# Patient Record
Sex: MALE | Race: AMERICAN INDIAN OR ALASKA NATIVE
[De-identification: names, ages, dates, MRNs, and addresses within clinical notes are randomized per-mention and may not be internally consistent; named-entity substitution may affect disease eponyms.]

---

## 2017-05-06 ENCOUNTER — HOSPITAL ENCOUNTER (OUTPATIENT)
Dept: HOSPITAL 14 - H.ER | Age: 27
Setting detail: OBSERVATION
LOS: 4 days | Discharge: PSYCHIATRIC OR SUBSTANCE USE FACILITY | End: 2017-05-10
Attending: EMERGENCY MEDICINE | Admitting: EMERGENCY MEDICINE
Payer: COMMERCIAL

## 2017-05-06 DIAGNOSIS — S01.511A: ICD-10-CM
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
## 2017-05-06 DIAGNOSIS — Z87.891: ICD-10-CM

## 2017-05-06 DIAGNOSIS — Z78.1: ICD-10-CM
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
## 2017-05-06 DIAGNOSIS — F31.60: Primary | ICD-10-CM

## 2017-05-06 DIAGNOSIS — Z59.0: ICD-10-CM

## 2017-05-06 DIAGNOSIS — F63.9: ICD-10-CM

## 2017-05-06 DIAGNOSIS — R45.851: ICD-10-CM

## 2017-05-06 LAB
ALBUMIN/GLOB SERPL: 1.2 [,] (ref 1–2.1)
ALP SERPL-CCNC: 60 [, U/L] (ref 38–126)
ALT SERPL-CCNC: 96 [, U/L] (ref 21–72)
AST SERPL-CCNC: 96 [, U/L] (ref 17–59)
BASOPHILS # BLD AUTO: 0.1 [, K/UL] (ref 0–0.2)
BASOPHILS NFR BLD: 1.1 [, %] (ref 0–2)
BILIRUB SERPL-MCNC: 1 [, MG/DL] (ref 0.2–1.3)
BUN SERPL-MCNC: 6 [, MG/DL] (ref 9–20)
CALCIUM SERPL-MCNC: 9.2 [, MG/DL] (ref 8.4–10.2)
CHLORIDE SERPL-SCNC: 107 [, MMOL/L] (ref 98–107)
CO2 SERPL-SCNC: 23 [, MMOL/L] (ref 22–30)
EOSINOPHIL # BLD AUTO: 0.1 [, K/UL] (ref 0–0.7)
EOSINOPHIL NFR BLD: 1.4 [, %] (ref 0–4)
ERYTHROCYTE [DISTWIDTH] IN BLOOD BY AUTOMATED COUNT: 13.2 [, %] (ref 11.5–14.5)
ETHANOL SERPL-MCNC: 31 [, MG/DL] (ref 0–10)
GLOBULIN SER-MCNC: 3.4 [, GM/DL] (ref 2.2–3.9)
GLUCOSE SERPL-MCNC: 102 [, MG/DL] (ref 75–110)
HCT VFR BLD CALC: 42.7 [, %] (ref 35–51)
LYMPHOCYTES # BLD AUTO: 1.4 [, K/UL] (ref 1–4.3)
LYMPHOCYTES NFR BLD AUTO: 26.4 [, %] (ref 20–40)
MCH RBC QN AUTO: 31.5 [, PG] (ref 27–31)
MCHC RBC AUTO-ENTMCNC: 33.6 [, G/DL] (ref 33–37)
MCV RBC AUTO: 93.9 [, FL] (ref 80–94)
MONOCYTES # BLD: 0.5 [, K/UL] (ref 0–0.8)
MONOCYTES NFR BLD: 9.8 [, %] (ref 0–10)
NEUTROPHILS # BLD: 3.2 [, K/UL] (ref 1.8–7)
NEUTROPHILS NFR BLD AUTO: 61.3 [, %] (ref 50–75)
NRBC BLD AUTO-RTO: 0.1 [, %] (ref 0–0)
PLATELET # BLD: 194 [, K/UL] (ref 130–400)
PMV BLD AUTO: 8.6 [, FL] (ref 7.2–11.7)
POTASSIUM SERPL-SCNC: 3.7 [, MMOL/L] (ref 3.6–5)
PROT SERPL-MCNC: 7.5 [, G/DL] (ref 6.3–8.2)
SODIUM SERPL-SCNC: 141 [, MMOL/L] (ref 132–148)
WBC # BLD AUTO: 5.2 [, K/UL] (ref 4.8–10.8)

## 2017-05-06 SDOH — ECONOMIC STABILITY - HOUSING INSECURITY: HOMELESSNESS: Z59.0

## 2017-05-06 NOTE — ED PDOC
- Laboratory Results


Result Diagrams: 


 05/06/17 04:45





 05/06/17 04:45





- ECG


O2 Sat by Pulse Oximetry: 98 (RA)





Medical Decision Making


Medical Decision Making: 


Receiving sign out:


Pt signed over to me by Dr. Castillo pending crisis evaluation. Pt required 

medication for psychosis.





Scribe Attestation:


Documented by Bailey Oro acting as a scribe for Beverley Sun MD.





Provider Attestation:


All medical record entries made by the Scribe were at my direction and 

personally dictated by me. I have reviewed the chart and agree that the record 

accurately reflects my personal performance of the history, physical exam, 

medical decision making, and the department course for this patient. I have 

also personally directed, reviewed, and agree with the discharge instructions 

and disposition.





Disposition





- Clinical Impression


Clinical Impression: 


 Depression, Laceration








- POA


Present On Arrival: None





- Disposition


Disposition: Transfer of Care


Disposition Time: 00:00


Condition: FAIR


Patient Signed Over To: Cesar Drew


Handoff Comments: Pending bed availability at [facility]





ED OBSERVATION


Date of observation admission: 05/06/17





- Observation admission statement


Patient is being placed in observation because:: 


Pt awaiting crisis evaluation





- Goals of Observation


Goals of observation are:: 


Crisis evaluation and final ER disposition.





- Progress Note


Progress Note: 





05/06/17 15:55


Pt resting in room.


7p Stable.


10p Stable


12am Endorsed to Dr Drew. Pending bed availability. Stable.

## 2017-05-06 NOTE — ED PDOC
- Laboratory Results


Result Diagrams: 


 05/06/17 04:45





 05/06/17 04:45





- ECG


O2 Sat by Pulse Oximetry: 100 (RA)


Pulse Ox Interpretation: Normal





Medical Decision Making


Medical Decision Making: 


Receiving Sign Out:


Pt signed out to me by Dr. Drew pending [facility] screen.





Scribe Attestation:


Documented by Bailey Oro acting as a scribe for Troy Castillo MD.





Provider Attestation:


All medical record entries made by the Scribe were at my direction and 

personally dictated by me. I have reviewed the chart and agree that the record 

accurately reflects my personal performance of the history, physical exam, 

medical decision making, and the department course for this patient. I have 

also personally directed, reviewed, and agree with the discharge instructions 

and disposition.





Disposition





- Disposition


Condition: FAIR





Progress Note





- Review of Symptoms


Events since last encounter: 


Time: 0943


Pt attempted to elope and ran into a door. Sustained 2 lip lacerations. Will 

suture wounds and sedate patient.


2mg Ativan and 5mg Haldol administered. See procedure note for wound care.





Procedures





- Time-Out


Type of Procedure: Laceration repair


Correct Patient: Yes


Correct Procedure: Yes


Physician Name: Troy Castillo





- Laceration/Wound Repair


  ** Inner lip laceration


Wound Length (cm): 2


Wound's Depth, Shape: superficial, linear


Wound Explored: clean


Betadine Prep?: Yes


Anesthesia: 1% Lidocaine


Wound Repaired With: Sutures


Suture Size/Type: 5:0, nylon


Number of Sutures: 3


Wound Complexity: Simple


Progress: 





Pt tolerated procedure well.





  ** Outer lip laceration


Wound Length (cm): 2


Wound's Depth, Shape: superficial, linear


Betadine Prep?: Yes


Anesthesia: 1% Lidocaine


Wound Repaired With: Sutures


Suture Size/Type: 5:0, nylon


Number of Sutures: 3


Wound Complexity: Simple


Progress: 





Pt tolerate procedure well.

## 2017-05-06 NOTE — ED PDOC
HPI: Psych/Substance Abuse





<Cesar Drew - Last Filed: 05/06/17 06:47>


Chief Complaint (Provider): pysch eval


History Per: Patient


Additional Complaint(s): 





25yo M in ED for eval-sent to ED friends called EMS for alleged FB post stating 

that he wants to kill himself. Ptdenies posting anything, denies SI/HI/

hallucinations. 





<Pepper Milton - Last Filed: 05/06/17 21:01>


Time Seen by Provider: 05/06/17 03:16


Chief Complaint (Nursing): Psychiatric Evaluation





Past Medical History


Vital Signs: 





 Last Vital Signs











Temp  97.9 F   05/06/17 02:41


 


Pulse  57 L  05/06/17 02:41


 


Resp  17 05/06/17 02:41


 


BP  122/48 L  05/06/17 02:41


 


Pulse Ox  100   05/06/17 03:37














<Cesar Drew - Last Filed: 05/06/17 06:47>


Reviewed: Historical Data, Nursing Documentation, Vital Signs


Vital Signs: 





 Last Vital Signs











Temp  97.9 F   05/06/17 02:41


 


Pulse  57 L  05/06/17 02:41


 


Resp  17 05/06/17 02:41


 


BP  122/48 L  05/06/17 02:41


 


Pulse Ox  100   05/06/17 02:41














- Medical History


PMH: No Chronic Diseases





- Family History


Family History: States: No Known Family Hx





<Pepper Milton - Last Filed: 05/06/17 21:01>





- Home Medications


Home Medications: 


 Ambulatory Orders











 Medication  Instructions  Recorded


 


Unobtainable  05/06/17














- Allergies


Allergies/Adverse Reactions: 


 Allergies











Allergy/AdvReac Type Severity Reaction Status Date / Time


 


diphenhydramine Allergy Mild RASH Verified 05/06/17 02:45





[From Benadryl]     














Review of Systems


ROS Statement: Except As Marked, All Systems Reviewed And Found Negative


Psych: Negative for: Anxiety, Depression, Psychosis, Suicidal ideation, 

Withdrawal





<Pepper Milton - Last Filed: 05/06/17 21:01>





Physical Exam





- Reviewed


Nursing Documentation Reviewed: Yes


Vital Signs Reviewed: Yes





- Physical Exam


Appears: Positive for: Well, Non-toxic, No Acute Distress


Head Exam: Positive for: ATRAUMATIC, NORMAL INSPECTION, NORMOCEPHALIC


Skin: Positive for: Normal Color, Warm, DRY


Eye Exam: Positive for: EOMI, Normal appearance, PERRL


Cardiovascular/Chest: Positive for: Regular Rate, Rhythm


Respiratory: Positive for: CNT, Normal Breath Sounds


Neurologic/Psych: Positive for: Alert, Oriented





<Pepper Milton - Last Filed: 05/06/17 21:01>





- Laboratory Results


Result Diagrams: 


 05/06/17 04:45





 05/06/17 04:45





<Cesar Drew - Last Filed: 05/06/17 06:47>





- Laboratory Results


Result Diagrams: 


 05/06/17 04:45





 05/06/17 04:45





- ECG


O2 Sat by Pulse Oximetry: 100





- Progress


ED Course And Treament: 





pt will be placed on 1:1 and get crisis evaluation. 


Re-evaluation Time: 03:36


Condition: Re-examined (pt became very agressive and required restraints and 

ativan IM. )





<Pepper Milton - Last Filed: 05/06/17 21:01>





Disposition





- Patient ED Disposition


Is Patient to be Admitted: Transfer of Care





- Disposition


Disposition: Transfer of Care


Disposition Time: 07:00


Patient Signed Over To: Troy Castillo (Transfer pending crisis eval)





<Cesar Drew - Last Filed: 05/06/17 06:47>





- Patient ED Disposition


Is Patient to be Admitted: Transfer of Care





<Pepper Milton - Last Filed: 05/06/17 21:01>





- Clinical Impression


Clinical Impression: 


 Depression, Laceration








- Disposition


Condition: FAIR

## 2017-05-07 LAB
BACTERIA #/AREA URNS HPF: (no result) [,]
BILIRUB UR-MCNC: NEGATIVE [,]
COLOR UR: YELLOW [,]
GLUCOSE UR STRIP-MCNC: (no result) [, MG/DL]
KETONES UR STRIP-MCNC: NEGATIVE [, MG/DL]
LEUKOCYTE ESTERASE UR-ACNC: (no result) [, LEU/UL]
PH UR STRIP: 6 [,] (ref 5–8)
PROT UR STRIP-MCNC: NEGATIVE [, MG/DL]
RBC # UR STRIP: NEGATIVE [,]
RBC #/AREA URNS HPF: 5 [, /HPF] (ref 0–3)
SP GR UR STRIP: 1.01 [,] (ref 1–1.03)
UROBILINOGEN UR-MCNC: (no result) [, MG/DL] (ref 0.2–1)
WBC #/AREA URNS HPF: 2 [, /HPF] (ref 0–5)

## 2017-05-07 NOTE — ED PDOC
- Laboratory Results


Result Diagrams: 


 05/06/17 04:45





 05/06/17 04:45





- ECG


ECG: Positive for: Interpreted By Me


ECG Rhythm: Positive for: Normal QRS, Normal ST Segment, Sinus Rhythm


O2 Sat by Pulse Oximetry: 98 (RA)





Disposition





- Clinical Impression


Clinical Impression: 


 Depression, Laceration








- POA


Present On Arrival: Falls Or Trauma





- Disposition


Disposition: Transfer of Care


Disposition Time: 00:00


Condition: STABLE





ED OBSERVATION


Date of observation admission: 05/06/17


Time of observation admission: 04:20





- Observation admission statement


Patient is being placed in observation because:: 





Was placed on obs for time intensive workup, treatment, evaluation and 

stabilization of psychiatric condition.





- Goals of Observation


Goals of observation are:: 





Stabilization of psychiatric condition until transfer to [facility]





- Progress Note


Progress Note: 


3p


Pt asking for books to read. Reporting he is bored and it is making him feel 

worse. Otherwise stable.


Pending eval Dr Clemente.





6p Slightly angry and asking to walk around. Able to redirect back to room.





9p Stable. Psych recommendations appreciated.





12am Endorsed to Dr Katz pending bed availability.

## 2017-05-07 NOTE — ED PDOC
- Laboratory Results


Result Diagrams: 


 05/06/17 04:45





 05/06/17 04:45





- ECG


O2 Sat by Pulse Oximetry: 98 (RA)





Medical Decision Making


Medical Decision Making: 


Patient s/o from Dr. Sun at 0000 pending [facility] bed availability. Patient s/

o to Dr. Fuentes at 0700 pending [facility] bed availability. 











-----------------------


Scribe Attestation:


Documented by STEPHEN Mckeon acting as a scribe for Kristine Katz MD.  


Provider Scribe Attestation:


All medical record entries made by the Scribe were at my direction and 

personally dictated by me. I


have reviewed the chart and agree that the record accurately reflects my 

personal performance of the


history, physical exam, medical decision making, and the department course for 

this patient. I have


also personally directed, reviewed, and agree with the discharge instructions 

and disposition.   








Disposition





- Clinical Impression


Clinical Impression: 


 Depression, Laceration








- POA


Present On Arrival: None





- Disposition


Disposition: Transfer of Care


Disposition Time: 07:00


Condition: STABLE


Patient Signed Over To: Mick Fuentes


Handoff Comments: pending [facility] bed availability





ED OBSERVATION


Date of observation admission: 05/06/17


Time of observation admission: 04:12





- Observation admission statement


Patient is being placed in observation because:: 





SI 





- Progress Note


Progress Note: 





0205: Patient resting comfortably in no distress.

## 2017-05-07 NOTE — ED PDOC
- Laboratory Results


Result Diagrams: 


 17 04:45





 17 04:45





- ECG


O2 Sat by Pulse Oximetry: 100





Medical Decision Making


Medical Decision Makin:





Patient is being transferred to Dr. Drew pending crisis evaluation.





Pt is to be screened by [facility]. 








Scribe Attestation:


Documented by Evelin Kelly acting as a scribe for Cesar Drew MD.





Provider Scribe Attestation:


All medical record entries made by theAntoniowere at my direction and 

personally dictated by me. I


have reviewed the chart and agree that the record accurately reflects my 

personal performance of the


history, physical exam, medical decision making, and the department course for 

this patient. I have


also personally directed, reviewed, and agree with the discharge instructions 

and disposition.


--------------------------------------------------------------------------------

----------------------------------





Disposition





- Clinical Impression


Clinical Impression: 


 Depression, Laceration








- POA


Present On Arrival: None





- Disposition


Disposition: Transfer of Care


Disposition Time: 07:00


Condition: FAIR


Patient Signed Over To: Gavino Baxter III


Handoff Comments: pending [facility] screen

## 2017-05-07 NOTE — CP.PCM.CON
History of Present Illness





- History of Present Illness


History of Present Illness: 





This is a 26 yr old male who has been brought in for psych evaluation because 

pt has posted on facebook that he wanted to kill himself.pt was offered in 

voluntary psych admission and pt refused and has been referred for screening by 

[facility]





Past Patient History





- Past Social History


Smoking Status: Former Smoker





- CARDIAC


Hx Cardiac Disorders: No


Hx Hypertension: No





- PULMONARY


Hx Tuberculosis: No





- NEUROLOGICAL


HX Cerebrovascular Accident: No


Hx Seizures: No





- HEMATOLOGICAL/ONCOLOGICAL


Hx Cancer: No


Hx Human Immunodeficiency Virus (HIV): No





- GENITOURINARY/GYNECOLOGICAL


Hx Sexually Transmitted Disorders: No





- PSYCHIATRIC


Hx Substance Use: No





- SURGICAL HISTORY


Hx Surgeries: Yes


Other/Comment: dental surgery





- ANESTHESIA


Hx Anesthesia: No





Meds


Allergies/Adverse Reactions: 


 Allergies











Allergy/AdvReac Type Severity Reaction Status Date / Time


 


diphenhydramine Allergy Mild RASH Verified 05/06/17 02:45





[From Benadryl]     














Results





- Vital Signs


Recent Vital Signs: 


 Last Vital Signs











Temp  98.2 F   05/07/17 09:20


 


Pulse  78   05/07/17 09:20


 


Resp  19   05/07/17 09:20


 


BP  123/76   05/07/17 09:20


 


Pulse Ox  98   05/07/17 16:33














- Labs


Result Diagrams: 


 05/06/17 04:45





 05/06/17 04:45


Labs: 


 Laboratory Results - last 24 hr











  05/07/17





  03:06


 


Urine Color  Yellow


 


Urine Clarity  Slighty-cloudy


 


Urine pH  6.0


 


Ur Specific Gravity  1.012


 


Urine Protein  Negative


 


Urine Glucose (UA)  Neg


 


Urine Ketones  Negative


 


Urine Blood  Negative


 


Urine Nitrate  Negative


 


Urine Bilirubin  Negative


 


Urine Urobilinogen  0.2-1.0


 


Ur Leukocyte Esterase  Neg


 


Urine RBC (Auto)  5 H


 


Urine Microscopic WBC  2


 


Urine Bacteria  Occ H

## 2017-05-08 NOTE — ED PDOC
- Laboratory Results


Result Diagrams: 


 05/06/17 04:45





 05/06/17 04:45





- ECG


O2 Sat by Pulse Oximetry: 98 (RA)





Disposition





- Clinical Impression


Clinical Impression: 


 Depression, Laceration








- POA


Present On Arrival: None





- Disposition


Disposition: Transfer of Care


Disposition Time: 17:00


Condition: STABLE


Patient Signed Over To: Beverley Sun





ED OBSERVATION





- Progress Note


Progress Note: 


05/08/17 07:00


signed over to me by KENTRELL Katz MD pending [facility] bed availability. 





05/08/17 07:26


CXR from previous shift as interpreted by me:


no acute infiltrates, no active disease





05/08/17 08:34


vitals are stable





1030


vitals remain stable





1200


vitals remain stable. resting comfortably





1345


vitals remain stable





05/08/17 14:21


resting comfortably





05/08/17 16:33


vitals are stable





1700


signed over to SHEN Sun MD pending [facility] bed availability.





Additional Comments





- Additional Comments


Additional Comments: 


Scribe Attestation:


Documented by Rip Rocha acting as a scribe for Mick Fuentes MD.


Provider Scribe Attestation:


All medical record entries made by the Scribe were at my direction and 

personally dictated by me. I


have reviewed the chart and agree that the record accurately reflects my 

personal performance of the


history, physical exam, medical decision making, and the department course for 

this patient. I have


also personally directed, reviewed, and agree with the discharge instructions 

and disposition.

## 2017-05-08 NOTE — CARD
--------------- APPROVED REPORT --------------





EKG Measurement

Heart [id number]

MD [id number]

[id number]

[id number]

[id number]



<Conclusion>

Normal sinus rhythm

Normal ECG

## 2017-05-08 NOTE — ED PDOC
- Laboratory Results


Result Diagrams: 


 05/06/17 04:45





 05/06/17 04:45





- ECG


O2 Sat by Pulse Oximetry: 98





Disposition





- Clinical Impression


Clinical Impression: 


 Depression, Laceration








- POA


Present On Arrival: None





- Disposition


Disposition: Transfer of Care


Disposition Time: 00:00


Condition: STABLE


Patient Signed Over To: Kristine Katz


Handoff Comments: Pending bed availability [facility]





ED OBSERVATION





- Progress Note


Progress Note: 








5p Rec'd endorsement from Dr Fuentes. Pt with suicidality and accepted for 

admission at [facility] Involuntary psych. Pending bed.





630p Pt attempted to leave ER. Restraints ordered. Prn meds given.





9p Stable





12a Endorsed to Dr Katz pending bed. Stable.

## 2017-05-08 NOTE — CP.PCM.CON
History of Present Illness





- History of Present Illness


History of Present Illness: 





psychiatry consult


per routine


reason: agitation/suicidal threats





cc: i'm not crazy





hpi: 25 yo male, hospitalized 6 years previously, with what seems like 

synthetic mj reaction- "smoked something and went into a coma?"- per chart was 

bizarre behavior, dx with schizophrenia and placed on risperdal which may have 

lowered seizure threshold. pt is now in ER after being brought by police when 

people became concerned with his social media posts- possible suicidal/

homicidal threats? pt also recently made suicidal threats to mother per the 

crisis report. pt was very agitated and tried to elope and was restrained. he 

is irritable now, thinking this writer can discharge him home immediately. he 

superficially agrees to sign into hospital but then follows that up with "so i 

can start my discharge papers now right?" 





pt has been fighting with family- brother/mother and strangers in past week. he 

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 

representative. he denies having current psychiatric treatment.





past psych: as above


social history; ? homeless, has family who lives locally.


substance use: drinks alcohol, denies other substance use. uds is negative


medical: denies





mse: alert, oriented x 3. malodorous and unkempt. mood is irritated. affect 

constricted. speech is with angry tone. thoughts are focused on discharge. pt 

is denying si/hi currently. he is denying a/v hallucinations currently. memory 

intact. pt is minimizing presenting behaviors. poor i/j.





assessment:


bipolar disorder, mixed?- pt with irritable mood, poor impulse control, anger, 

suicidal threats





recommendation


prn meds are appropriate


pt is refusing oral meds and demanding to sign out, thus not a candidate to 

change status to voluntary


transfer to [facility] when bed available. 





Past Patient History





- Past Social History


Smoking Status: Former Smoker





- CARDIAC


Hx Cardiac Disorders: No


Hx Hypertension: No





- PULMONARY


Hx Tuberculosis: No





- NEUROLOGICAL


HX Cerebrovascular Accident: No


Hx Seizures: No





- HEMATOLOGICAL/ONCOLOGICAL


Hx Cancer: No


Hx Human Immunodeficiency Virus (HIV): No





- GENITOURINARY/GYNECOLOGICAL


Hx Sexually Transmitted Disorders: No





- PSYCHIATRIC


Hx Substance Use: No





- SURGICAL HISTORY


Hx Surgeries: Yes


Other/Comment: dental surgery





- ANESTHESIA


Hx Anesthesia: No





Meds


Allergies/Adverse Reactions: 


 Allergies











Allergy/AdvReac Type Severity Reaction Status Date / Time


 


diphenhydramine Allergy Mild RASH Verified 05/06/17 02:45





[From Benadryl]     














- Medications


Medications: 


 Current Medications





Haloperidol (Haldol)  5 mg PO TID PRN


   PRN Reason: Agitation


Haloperidol Lactate (Haldol)  5 mg IM Q6 PRN


   PRN Reason: Agitation


Lorazepam (Ativan)  1 mg IM Q6 PRN


   PRN Reason: Agitation


Lorazepam (Ativan)  1 mg PO TID PRN


   PRN Reason: Agitation











Results





- Vital Signs


Recent Vital Signs: 


 Last Vital Signs











Temp  97.8 F   05/08/17 13:51


 


Pulse  75   05/08/17 13:51


 


Resp  18   05/08/17 13:51


 


BP  116/57 L  05/08/17 13:51


 


Pulse Ox  98   05/08/17 14:22














- Labs


Result Diagrams: 


 05/06/17 04:45





 05/06/17 04:45

## 2017-05-08 NOTE — RAD
HISTORY:

med clr  



COMPARISON:

No prior. 



FINDINGS:



LUNGS:

Elevation left hemidiaphragm (lateral aspect greater than medial) 

with slight blunting left CP angle could be due to small effusion or 

chronic pleural thickening.



PLEURA:

As above. No pneumothorax apparent.



CARDIOVASCULAR:

Normal.



OSSEOUS STRUCTURES:

Mild dextroscoliosis mid thoracic region



VISUALIZED UPPER ABDOMEN:

Normal.



OTHER FINDINGS:

None.



IMPRESSION:

Elevation left hemidiaphragm (lateral aspect greater than medial) 

with slight blunting left CP angle could be due to small effusion or 

chronic pleural thickening.

## 2017-05-09 VITALS — TEMPERATURE: 97.8 F

## 2017-05-09 NOTE — CP.PCM.CON
History of Present Illness





- History of Present Illness


History of Present Illness: 


Psychiatry consult follow-up note, initally called for agitation/suicidal 

threats





CC: "I don't need to go to [place].  You need to go."





*Patient yelled at writer, refused to engage in conversation, stating that he 

does not need to go to [place].  Collateral obtained from the chart below.  





hpi: 27 yo male, hospitalized 6 years previously, with what seems like 

synthetic mj reaction- "smoked something and went into a coma?"- per chart was 

bizarre behavior, dx with schizophrenia and placed on risperdal which may have 

lowered seizure threshold. pt is now in ER after being brought by police when 

people became concerned with his social media posts- possible suicidal/

homicidal threats? pt also recently made suicidal threats to mother per the 

crisis report. pt was very agitated and tried to elope and was restrained. he 

is irritable now, thinking this writer can discharge him home immediately. he 

superficially agrees to sign into hospital but then follows that up with "so i 

can start my discharge papers now right?" 





pt has been fighting with family- brother/mother and strangers in past week. he 

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 

representative. he denies having current psychiatric treatment.





past psych: as above


social history; ? homeless, has family who lives locally.


substance use: drinks alcohol, denies other substance use. uds is negative


medical: denies





mse: alert, oriented x 3. malodorous and unkempt. mood is irritated. affect 

constricted. speech is with angry tone. thoughts are focused on discharge. pt 

refusing to answer questions including AH/VH/SI/HI. poor i/j.





assessment:


bipolar disorder, mixed?- pt with irritable mood, poor impulse control, anger, 

suicidal threats





recommendation


prn meds are appropriate


pt is refusing oral meds and demanding to sign out, thus not a candidate to 

change status to voluntary


transfer to [facility] when bed available. 





Past Patient History





- Past Social History


Smoking Status: Former Smoker





- CARDIAC


Hx Cardiac Disorders: No


Hx Hypertension: No





- PULMONARY


Hx Tuberculosis: No





- NEUROLOGICAL


HX Cerebrovascular Accident: No


Hx Seizures: No





- HEMATOLOGICAL/ONCOLOGICAL


Hx Cancer: No


Hx Human Immunodeficiency Virus (HIV): No





- GENITOURINARY/GYNECOLOGICAL


Hx Sexually Transmitted Disorders: No





- PSYCHIATRIC


Hx Substance Use: No





- SURGICAL HISTORY


Hx Surgeries: Yes


Other/Comment: dental surgery





- ANESTHESIA


Hx Anesthesia: No





Meds


Allergies/Adverse Reactions: 


 Allergies











Allergy/AdvReac Type Severity Reaction Status Date / Time


 


diphenhydramine Allergy Mild RASH Verified 05/06/17 02:45





[From Benadryl]     














- Medications


Medications: 


 Current Medications





Haloperidol (Haldol)  5 mg PO TID PRN


   PRN Reason: Agitation


Haloperidol Lactate (Haldol)  5 mg IM Q6 PRN


   PRN Reason: Agitation


   Last Admin: 05/09/17 12:28 Dose:  5 mg


Lorazepam (Ativan)  1 mg IM Q6 PRN


   PRN Reason: Agitation


   Last Admin: 05/09/17 12:28 Dose:  1 mg


Lorazepam (Ativan)  1 mg PO TID PRN


   PRN Reason: Agitation











Results





- Vital Signs


Recent Vital Signs: 


 Last Vital Signs











Temp  97.8 F   05/09/17 08:00


 


Pulse  87   05/09/17 08:00


 


Resp  19   05/09/17 08:00


 


BP  123/67   05/09/17 08:00


 


Pulse Ox  99   05/09/17 08:00














- Labs


Result Diagrams: 


 05/06/17 04:45





 05/06/17 04:45

## 2017-05-09 NOTE — ED PDOC
- Laboratory Results


Result Diagrams: 


 05/06/17 04:45





 05/06/17 04:45





- ECG


O2 Sat by Pulse Oximetry: 98





- Progress


ED Course And Treament: 





Pt sleeping comfortably, 1:1 observation maintained. 


Re-evaluation Time: 18:30


Condition: Unchanged





Disposition





- Clinical Impression


Clinical Impression: 


 Depression, Laceration








- POA


Present On Arrival: None





- Disposition


Disposition: Transfer of Care


Disposition Time: 19:00


Condition: STABLE


Patient Signed Over To: Marvin Snow


Handoff Comments: Pending transfer to [facility].





Addendum


Addendum: 





05/09/17 17:03





Pt signed our by Dr. Drew pending [facility] transfer.

## 2017-05-09 NOTE — ED PDOC
- Laboratory Results


Result Diagrams: 


 05/06/17 04:45





 05/06/17 04:45





- ECG


O2 Sat by Pulse Oximetry: 98





Medical Decision Making


Medical Decision Making: 


Patient s/o from Dr. Sun at 0000 pending [facility] [place] availability.





Patient sleeping comfortably in no distress. Patient s/o to Dr. Drew at 

0700 pending [facility] [place]. 





--------------------


Scribe Attestation:


Documented by STEPHEN Mckeon acting as a scribe for Kristine Katz MD.   


Provider Scribe Attestation:


All medical record entries made by the Scribe were at my direction and 

personally dictated by me. I


have reviewed the chart and agree that the record accurately reflects my 

personal performance of the


history, physical exam, medical decision making, and the department course for 

this patient. I have


also personally directed, reviewed, and agree with the discharge instructions 

and disposition.  








Disposition





- Clinical Impression


Clinical Impression: 


 Depression, Laceration








- POA


Present On Arrival: None





- Disposition


Disposition: Transfer of Care


Disposition Time: 07:00


Condition: STABLE


Patient Signed Over To: Cesar Drew


Handoff Comments: pending [facility] [place] availability

## 2017-05-09 NOTE — ED PDOC
- Laboratory Results


Result Diagrams: 


 05/06/17 04:45





 05/06/17 04:45





- ECG


O2 Sat by Pulse Oximetry: 98





Medical Decision Making


Medical Decision Making: 





Time: 0700





Patient signed out pending [facility] bed availability 





 Scribe Attestation:


Documented by Terri Dove acting as a scribe for Cesar Drew MD MD Scribe Attestation:


All medical record entries made by the Scribe were at my direction and 

personally dictated by me. I have reviewed the chart and agree that the record 

accurately reflects my personal performance of the history, physical exam, 

medical decision making, and the department course for this patient. I have 

also personally directed, reviewed, and agree with the discharge instructions 

and disposition.





Disposition





- Clinical Impression


Clinical Impression: 


 Depression, Laceration








- POA


Present On Arrival: None





- Disposition


Disposition: Transfer of Care


Disposition Time: 17:00


Condition: STABLE


Patient Signed Over To: Lakesha Yap


Handoff Comments: pending bed at [facility]

## 2017-05-09 NOTE — ED PDOC
- Laboratory Results


Result Diagrams: 


 17 04:45





 17 04:45





- ECG


O2 Sat by Pulse Oximetry: 98





- Progress


ED Course And Treament: 





Patient is sleeping comfortably. 1:1 observation is maintained.





Re-evaluation Time: 20:00


Condition: Re-examined, Unchanged





Medical Decision Making


Medical Decision Makin:00


Patient is signed out to me by Lakesha Yap MD pending [facility] transfer.


 Upon bed availability pt transferred to [facility] at 1AM uneventfully








--------------------------------------------------------------------------------

----------------- 


Scribe Attestation:


Documented by Lacy Marcial, acting as a scribe for Marvin Snow MD.


 


Provider Scribe Attestation:


All medical record entries made by the Scribe were at my direction and 

personally dictated by me. I have reviewed the chart and agree that the record 

accurately reflects my personal performance of the history, physical exam, 

medical decision making, and the department course for this patient. I have 

also personally directed, reviewed, and agree with the discharge instructions 

and disposition.








Disposition





- Clinical Impression


Clinical Impression: 


 Depression, Laceration








- POA


Present On Arrival: None





- Disposition


Disposition: Transfer of Care


Disposition Time: 01:00


Condition: STABLE

## 2017-05-10 VITALS — RESPIRATION RATE: 16 BRPM | SYSTOLIC BLOOD PRESSURE: 128 MMHG | HEART RATE: 82 BPM | DIASTOLIC BLOOD PRESSURE: 62 MMHG

## 2017-05-10 VITALS — OXYGEN SATURATION: 98 %

## 2019-04-16 ENCOUNTER — HOSPITAL ENCOUNTER (EMERGENCY)
Dept: HOSPITAL 14 - H.ER | Age: 29
Discharge: HOME | End: 2019-04-16
Payer: SELF-PAY

## 2019-04-16 VITALS
SYSTOLIC BLOOD PRESSURE: 108 MMHG | RESPIRATION RATE: 18 BRPM | DIASTOLIC BLOOD PRESSURE: 71 MMHG | TEMPERATURE: 98.5 F | HEART RATE: 64 BPM | OXYGEN SATURATION: 98 %

## 2019-04-16 VITALS — BODY MASS INDEX: 27.8 KG/M2

## 2019-04-16 DIAGNOSIS — Z59.0: ICD-10-CM

## 2019-04-16 DIAGNOSIS — Z86.59: ICD-10-CM

## 2019-04-16 DIAGNOSIS — Z88.8: ICD-10-CM

## 2019-04-16 DIAGNOSIS — Z76.5: Primary | ICD-10-CM

## 2019-04-16 DIAGNOSIS — Z87.891: ICD-10-CM

## 2019-04-16 SDOH — ECONOMIC STABILITY - HOUSING INSECURITY: HOMELESSNESS: Z59.0

## 2019-04-16 NOTE — ED PDOC
Lower Extremity Pain/Injury


Time Seen by Provider: 04/16/19 05:10


Chief Complaint (Nursing): Lower Extremity Problem/Injury


Chief Complaint (Provider): Bilateral foot pain 


History Per: Patient


History/Exam Limitations: no limitations


Onset/Duration Of Symptoms: Hrs


Current Symptoms Are (Timing): Still Present


Additional Complaint(s): 


28 year old male with a history of bipolar disorder presents to the ED for an 

evaluation of bilateral foot pain. Patient states he walked many miles for 2 

hours after his sister kicked him out. Denies other complaints. 


PMD:Non [facility] provider 











Past Medical History


Reviewed: Historical Data, Nursing Documentation, Vital Signs


Vital Signs: 





                                Last Vital Signs











Temp  98.5 F   04/16/19 05:14


 


Pulse  64   04/16/19 05:14


 


Resp  18   04/16/19 05:14


 


BP  108/71   04/16/19 05:14


 


Pulse Ox  98   04/16/19 05:14














- Medical History


PMH: Bipolar Disorder


   Denies: Diabetes, Hepatitis, HIV, HTN, Seizures, Sexually Transmitted Disease





- Surgical History


Surgical History: No Surg Hx





- Family History


Family History: States: Unknown Family Hx





- Social History


Ex-Smoker (has not smoked in the last 12 months): No


Alcohol: Social





- Home Medications


Home Medications: 


                                Ambulatory Orders











 Medication  Instructions  Recorded


 


Unobtainable  05/06/17














- Allergies


Allergies/Adverse Reactions: 


                                    Allergies











Allergy/AdvReac Type Severity Reaction Status Date / Time


 


diphenhydramine Allergy Mild RASH Verified 04/16/19 05:14





[From Benadryl]     














Review of Systems


ROS Statement: Except As Marked, All Systems Reviewed And Found Negative


Constitutional: Negative for: Fever


Musculoskeletal: Positive for: Foot Pain (bilateral)





Physical Exam





- Reviewed


Nursing Documentation Reviewed: Yes


Vital Signs Reviewed: Yes





- Physical Exam


Appears: Positive for: Non-toxic, No Acute Distress.  Negative for: Well (poor 

hygiene)


Neck: Positive for: Normal, Painless ROM


Cardiovascular/Chest: Positive for: Regular Rate, Rhythm


Respiratory: Positive for: Normal Breath Sounds.  Negative for: Respiratory 

Distress


Gastrointestinal/Abdominal: Positive for: Soft


Extremity: Positive for: Normal ROM, Capillary Refill (less than 2 s, pulses 2+ 

neurovascular intact), Other (no swelling, tenderness or deformtiy of either 

foot).  Negative for: Tenderness, Pedal Edema, Deformity, Swelling


Neurological/Psych: Positive for: Awake, Alert, Normal Tone, Oriented (x3)





- ECG


O2 Sat by Pulse Oximetry: 98 (RA)


Pulse Ox Interpretation: Normal





Medical Decision Making


Medical Decision Making: 


Time: 0520


Plan: 


pt currently requesting food. given to pt





Patient did not want to wait for crisis team to provide him a list of shelters. 





 

--------------------------------------------------------------------------------


-----------------


Scribe Attestation:


Documented by Kp Padilla, acting as a scribe for Kristine Katz MD


 


Provider Scribe Attestation:


All medical record entries made by the Scribe were at my direction and perso

chantel dictated by me. I have reviewed the chart and agree that the record 

accurately reflects my personal performance of the history, physical exam, 

medical decision making, and the department course for this patient. I have also

personally directed, reviewed, and agree with the discharge instructions and 

disposition. 











Disposition





- Clinical Impression


Clinical Impression: 


 Homelessness








- Patient ED Disposition


Is Patient to be Admitted: No


Counseled Patient/Family Regarding: Diagnosis, Need For Followup





- Disposition


Disposition: Routine/Home


Disposition Time: 07:17


Condition: IMPROVED


Additional Instructions: 


follow up as an outpatient at [facility]


return to the ED with any worsening or concerning symptoms


Forms:  [employer] (English)

## 2019-04-19 ENCOUNTER — HOSPITAL ENCOUNTER (EMERGENCY)
Dept: HOSPITAL 14 - H.ER | Age: 29
Discharge: HOME | End: 2019-04-19
Payer: SELF-PAY

## 2019-04-19 VITALS — OXYGEN SATURATION: 99 % | RESPIRATION RATE: 18 BRPM

## 2019-04-19 VITALS — HEART RATE: 88 BPM | DIASTOLIC BLOOD PRESSURE: 63 MMHG | TEMPERATURE: 97.6 F | SYSTOLIC BLOOD PRESSURE: 119 MMHG

## 2019-04-19 VITALS — BODY MASS INDEX: 27.8 KG/M2

## 2019-04-19 DIAGNOSIS — Z86.59: ICD-10-CM

## 2019-04-19 DIAGNOSIS — Z76.5: Primary | ICD-10-CM

## 2019-04-19 DIAGNOSIS — Z88.8: ICD-10-CM

## 2019-04-19 NOTE — ED PDOC
HPI: General Adult


Time Seen by Provider: 04/19/19 03:43


Chief Complaint (Nursing): Anxiety


Chief Complaint (Provider): Anxiety


History Per: Patient


History/Exam Limitations: no limitations


Additional Complaint(s): 





28 years old male with no significant PMHx presents to ER admittedly to sleep. 

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 

Patient states when he gets off work in the early mornings he doesn't have a 

direct ride home and reports by the time he arrives home, he is fully awake and 

needs to return back to work. He reports couple of days ago he was hearing 

voices and states after drinking lots of caffeine, he started having 

palpitations. Patient denies hallucinations, suicidal or homicidal ideation.





PMD: None provided





Past Medical History


Reviewed: Historical Data, Nursing Documentation, Vital Signs


Vital Signs: 





                                Last Vital Signs











Temp  98.6 F   04/19/19 03:42


 


Pulse  98 H  04/19/19 03:42


 


Resp  18   04/19/19 03:42


 


BP  136/80   04/19/19 03:42


 


Pulse Ox  99   04/19/19 03:42














- Medical History


PMH: Bipolar Disorder


   Denies: Diabetes, Hepatitis, HIV, HTN, Seizures, Sexually Transmitted Disease





- Surgical History


Surgical History: No Surg Hx





- Family History


Family History: States: Unknown Family Hx





- Home Medications


Home Medications: 


                                Ambulatory Orders











 Medication  Instructions  Recorded


 


Unobtainable  05/06/17














- Allergies


Allergies/Adverse Reactions: 


                                    Allergies











Allergy/AdvReac Type Severity Reaction Status Date / Time


 


diphenhydramine Allergy Mild RASH Verified 04/19/19 03:54





[From Benadryl]     














Review of Systems


ROS Statement: Except As Marked, All Systems Reviewed And Found Negative


Psych: Negative for: Suicidal ideation (or homicidal)





Physical Exam





- Reviewed


Nursing Documentation Reviewed: Yes


Vital Signs Reviewed: Yes





- Physical Exam


Appears: Positive for: Well, No Acute Distress


Head Exam: Positive for: ATRAUMATIC, NORMOCEPHALIC


Skin: Positive for: Normal Color, Warm, Dry


Eye Exam: Positive for: Normal appearance, EOMI, PERRL


ENT: Positive for: Normal ENT Inspection


Neck: Positive for: Normal, Painless ROM, Supple


Cardiovascular/Chest: Positive for: Regular Rate, Rhythm.  Negative for: Murmur


Respiratory: Positive for: Normal Breath Sounds.  Negative for: Respiratory 

Distress


Gastrointestinal/Abdominal: Positive for: Normal Exam, Soft.  Negative for: 

Tenderness


Back: Positive for: Normal Inspection.  Negative for: L CVA Tenderness, R CVA 

Tenderness


Extremity: Positive for: Normal ROM.  Negative for: Pedal Edema, Deformity


Neurological/Psych: Positive for: Awake, Alert, Oriented (x3)





- ECG


O2 Sat by Pulse Oximetry: 99 (RA)


Pulse Ox Interpretation: Normal





Medical Decision Making


Medical Decision Making: 





Time: 0358


MDM: malingering with the goal of having a bed to sleep on


Patient to be discharged home.


 

--------------------------------------------------------------------------------


-----------------


Scribe Attestation:


Documented by Miguelina Aponte, acting as a scribe for Lacy Cosby MD.


 


Provider Scribe Attestation:


All medical record entries made by the Scribe were at my direction and 

personally dictated by me. I have reviewed the chart and agree that the record 

accurately reflects my personal performance of the history, physical exam, 

medical decision making, and the department course for this patient. I have also

personally directed, reviewed, and agree with the discharge instructions and 

disposition.





Disposition





- Clinical Impression


Clinical Impression: 


 Malingering








- Patient ED Disposition


Is Patient to be Admitted: No





- Disposition


Disposition: Routine/Home


Disposition Time: 03:58


Condition: STABLE